# Patient Record
Sex: MALE | Race: WHITE
[De-identification: names, ages, dates, MRNs, and addresses within clinical notes are randomized per-mention and may not be internally consistent; named-entity substitution may affect disease eponyms.]

---

## 2019-11-25 NOTE — OP
DATE OF PROCEDURE:  11/21/2019



PREOPERATIVE DIAGNOSIS:  Left inguinal hernia.



POSTOPERATIVE DIAGNOSIS:  Bilateral inguinal hernia.



PROCEDURE PERFORMED:  Da Aric laparoscopic bilateral inguinal hernia repair with

mesh, 3DMax large. 



ANESTHESIA:  General.



ESTIMATED BLOOD LOSS:  Minimal.



COMPLICATIONS:  None.



SPECIMEN:  None.



FINDINGS:  Bilateral inguinal hernia.



TECHNIQUE:  The patient was taken to the operating room and laid supine on the

operating table.  After general anesthetic was obtained, Tirado was placed, and the

abdomen was shaved, prepped, and draped in a sterile fashion.  An incision was made

above the umbilicus, cautery dissected down to and score the fascia.  Abdominal

cavity was entered bluntly using a Janee clamp.  The 11 mm robot balloon trocar was

placed, and high-flow pneumoperitoneum was obtained.  Left and right abdominal 8 mm

robot trocars were placed.  All ports were docked to the robot.  The peritoneum was

taken down in the bilateral groin revealing bilateral inguinal hernias.  The

preperitoneal space was dissected all the way to pubic tubercle medially, anterior

superior iliac crest laterally.  The shelving edge of inguinal ligament was fully

exposed.  The 3DMax large mesh was brought into the abdomen and placed in through

the 11 mm port.  The bilateral M-labeled medial aspect was placed over the pubic

tubercles medially, and the meshes were laid out laterally to cover the femoral

indirect and direct areas.  The mesh was sewn via Vicryl to the pubic tubercle

medially and to the posterior fascia laterally.  The peritoneum was reapproximated

using running 3-0 Stratafix.  All port sites were infiltrated using local

anesthetic.  All ports were removed under camera visualization. 

Pneumoperitoneum was let down.  PDS was used to close the fascial defect above the

umbilicus.  All incisions were irrigated and closed using 4-0 Monocryl and

Dermabond.  The patient was sent to Recovery in stable condition.  All instrument

counts, needle counts, and lap counts are correct. 







Job ID:  [id number]

## 2022-04-01 ENCOUNTER — HOSPITAL ENCOUNTER (OUTPATIENT)
Dept: HOSPITAL 92 - NM | Age: 47
Discharge: HOME | End: 2022-04-01
Payer: COMMERCIAL

## 2022-04-01 DIAGNOSIS — R25.1: Primary | ICD-10-CM

## 2022-04-01 DIAGNOSIS — G20: ICD-10-CM

## 2022-04-01 PROCEDURE — A9584 IODINE I-123 IOFLUPANE: HCPCS

## 2022-04-01 PROCEDURE — 78803 RP LOCLZJ TUM SPECT 1 AREA: CPT

## 2022-05-24 ENCOUNTER — HOSPITAL ENCOUNTER (OUTPATIENT)
Dept: HOSPITAL 92 - BICMRI | Age: 47
Discharge: HOME | End: 2022-05-24
Payer: COMMERCIAL

## 2022-05-24 DIAGNOSIS — G20: Primary | ICD-10-CM

## 2022-05-24 PROCEDURE — 70551 MRI BRAIN STEM W/O DYE: CPT
